# Patient Record
Sex: MALE | Race: NATIVE HAWAIIAN OR OTHER PACIFIC ISLANDER | HISPANIC OR LATINO
[De-identification: names, ages, dates, MRNs, and addresses within clinical notes are randomized per-mention and may not be internally consistent; named-entity substitution may affect disease eponyms.]

---
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
## 2023-01-12 ENCOUNTER — APPOINTMENT (OUTPATIENT)
Dept: ORTHOPEDIC SURGERY | Facility: CLINIC | Age: 18
End: 2023-01-12
Payer: MEDICAID

## 2023-01-12 ENCOUNTER — NON-APPOINTMENT (OUTPATIENT)
Age: 18
End: 2023-01-12

## 2023-01-12 VITALS — WEIGHT: 209 LBS | BODY MASS INDEX: 34.82 KG/M2 | HEIGHT: 65 IN

## 2023-01-12 PROCEDURE — 73610 X-RAY EXAM OF ANKLE: CPT | Mod: RT

## 2023-01-12 PROCEDURE — 99024 POSTOP FOLLOW-UP VISIT: CPT

## 2023-01-12 NOTE — PHYSICAL EXAM
[Right] : right foot and ankle [2+] : dorsalis pedis pulse: 2+ [NL (40)] : plantar flexion 40 degrees [NL (20)] : eversion 20 degrees [5___] : eversion 5[unfilled]/5 [] : negative anterior drawer at ankle [FreeTextEntry8] : improved [de-identified] : plantar flexion 30 degrees [de-identified] : inversion 25 degrees [TWNoteComboBox7] : dorsiflexion 15 degrees

## 2023-01-12 NOTE — HISTORY OF PRESENT ILLNESS
[Nothing helps with pain getting better] : Nothing helps with pain getting better [0] : 0 [de-identified] : 12/1/2022: fall 2 wks ago w/ ankle pain. went to ed. now wearing brace. prior ankle sprains. denies dm/tob.\par \par 01/12/2023: no complaints. walking in regular shoes. going to PT.  [] : no [FreeTextEntry1] : RT ankle

## 2023-01-12 NOTE — IMAGING
[Right] : right ankle [The fracture is in acceptable alignment. There is progression in healing seen] : The fracture is in acceptable alignment. There is progression in healing seen [FreeTextEntry9] : lat mall avulsion fx; old med mall injury

## 2023-05-15 ENCOUNTER — APPOINTMENT (OUTPATIENT)
Dept: ORTHOPEDIC SURGERY | Facility: CLINIC | Age: 18
End: 2023-05-15
Payer: MEDICAID

## 2023-05-15 VITALS — HEIGHT: 65 IN | BODY MASS INDEX: 34.82 KG/M2 | WEIGHT: 209 LBS

## 2023-05-15 DIAGNOSIS — S93.491A SPRAIN OF OTHER LIGAMENT OF RIGHT ANKLE, INITIAL ENCOUNTER: ICD-10-CM

## 2023-05-15 DIAGNOSIS — M25.579 PAIN IN UNSPECIFIED ANKLE AND JOINTS OF UNSPECIFIED FOOT: ICD-10-CM

## 2023-05-15 DIAGNOSIS — S82.61XA DISPLACED FRACTURE OF LATERAL MALLEOLUS OF RIGHT FIBULA, INITIAL ENCOUNTER FOR CLOSED FRACTURE: ICD-10-CM

## 2023-05-15 DIAGNOSIS — S82.61XD DISPLACED FRACTURE OF LATERAL MALLEOLUS OF RIGHT FIBULA, SUBSEQUENT ENCOUNTER FOR CLOSED FRACTURE WITH ROUTINE HEALING: ICD-10-CM

## 2023-05-15 PROCEDURE — 73610 X-RAY EXAM OF ANKLE: CPT | Mod: RT

## 2023-05-15 PROCEDURE — 99213 OFFICE O/P EST LOW 20 MIN: CPT

## 2023-05-15 NOTE — ASSESSMENT
[FreeTextEntry1] : improving\par wbat\par otc compression sleeve\par ice/elevate\par nsaids prn\par f/up 1 month if not resolved

## 2023-05-15 NOTE — HISTORY OF PRESENT ILLNESS
[0] : 0 [Nothing helps with pain getting better] : Nothing helps with pain getting better [2] : 2 [Dull/Aching] : dull/aching [de-identified] : 12/1/2022: fall 2 wks ago w/ ankle pain. went to ed. now wearing brace. prior ankle sprains. denies dm/tob.\par \par 01/12/2023: no complaints. walking in regular shoes. going to PT. \par \par 05/15/2023: fall 1 week ago w/ ankle pain. walking in regular shoes. went to ED> was doing well prior. walking in reg shoes [] : Post Surgical Visit: no [FreeTextEntry1] : RT ankle

## 2023-05-15 NOTE — PHYSICAL EXAM
[Right] : right foot and ankle [NL (40)] : plantar flexion 40 degrees [5___] : eversion 5[unfilled]/5 [2+] : dorsalis pedis pulse: 2+ [NL (20)] : dorsiflexion 20 degrees [NL 30)] : inversion 30 degrees [] : negative anterior drawer at ankle [de-identified] : inversion 25 degrees [TWNoteComboBox7] : dorsiflexion 15 degrees